# Patient Record
Sex: FEMALE | Race: BLACK OR AFRICAN AMERICAN | Employment: PART TIME | URBAN - METROPOLITAN AREA
[De-identification: names, ages, dates, MRNs, and addresses within clinical notes are randomized per-mention and may not be internally consistent; named-entity substitution may affect disease eponyms.]

---

## 2019-04-03 ENCOUNTER — APPOINTMENT (OUTPATIENT)
Dept: GENERAL RADIOLOGY | Age: 49
End: 2019-04-03
Attending: EMERGENCY MEDICINE
Payer: COMMERCIAL

## 2019-04-03 ENCOUNTER — HOSPITAL ENCOUNTER (EMERGENCY)
Age: 49
Discharge: HOME | End: 2019-04-03
Attending: EMERGENCY MEDICINE
Payer: COMMERCIAL

## 2019-04-03 VITALS
TEMPERATURE: 98 F | RESPIRATION RATE: 16 BRPM | DIASTOLIC BLOOD PRESSURE: 85 MMHG | HEART RATE: 87 BPM | OXYGEN SATURATION: 98 % | SYSTOLIC BLOOD PRESSURE: 133 MMHG

## 2019-04-03 DIAGNOSIS — S80.00XA CONTUSION OF KNEE, UNSPECIFIED LATERALITY, INITIAL ENCOUNTER: ICD-10-CM

## 2019-04-03 DIAGNOSIS — S16.1XXA STRAIN OF NECK MUSCLE, INITIAL ENCOUNTER: ICD-10-CM
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
## 2019-04-03 DIAGNOSIS — S20.219A CONTUSION OF CHEST WALL, UNSPECIFIED LATERALITY, INITIAL ENCOUNTER: ICD-10-CM

## 2019-04-03 PROCEDURE — 93005 ELECTROCARDIOGRAM TRACING: CPT

## 2019-04-03 PROCEDURE — 71045 X-RAY EXAM CHEST 1 VIEW: CPT

## 2019-04-03 PROCEDURE — 99285 EMERGENCY DEPT VISIT HI MDM: CPT

## 2019-04-03 PROCEDURE — 74011250637 HC RX REV CODE- 250/637: Performed by: EMERGENCY MEDICINE

## 2019-04-03 RX ORDER — IBUPROFEN 600 MG/1
600 TABLET ORAL
Qty: 20 TAB | Refills: 0 | Status: SHIPPED | OUTPATIENT
Start: 2019-04-03

## 2019-04-03 RX ORDER — DIAZEPAM 5 MG/1
5 TABLET ORAL
Qty: 6 TAB | Refills: 0 | Status: SHIPPED | OUTPATIENT
Start: 2019-04-03

## 2019-04-03 RX ORDER — ONDANSETRON 4 MG/1
4 TABLET, ORALLY DISINTEGRATING ORAL
Status: COMPLETED | OUTPATIENT
Start: 2019-04-03 | End: 2019-04-03

## 2019-04-03 RX ORDER — DIAZEPAM 5 MG/1
5 TABLET ORAL
Status: COMPLETED | OUTPATIENT
Start: 2019-04-03 | End: 2019-04-03

## 2019-04-03 RX ORDER — ACETAMINOPHEN 500 MG
1000 TABLET ORAL
Status: COMPLETED | OUTPATIENT
Start: 2019-04-03 | End: 2019-04-03

## 2019-04-03 RX ORDER — ACETAMINOPHEN 500 MG
1000 TABLET ORAL
Qty: 40 TAB | Refills: 0 | Status: SHIPPED | OUTPATIENT
Start: 2019-04-03

## 2019-04-03 RX ORDER — ONDANSETRON 4 MG/1
4 TABLET, ORALLY DISINTEGRATING ORAL
Qty: 8 TAB | Refills: 0 | Status: SHIPPED | OUTPATIENT
Start: 2019-04-03

## 2019-04-03 RX ORDER — IBUPROFEN 600 MG/1
600 TABLET ORAL
Status: COMPLETED | OUTPATIENT
Start: 2019-04-03 | End: 2019-04-03

## 2019-04-03 RX ADMIN — IBUPROFEN 600 MG: 600 TABLET, FILM COATED ORAL at 20:19

## 2019-04-03 RX ADMIN — ONDANSETRON 4 MG: 4 TABLET, ORALLY DISINTEGRATING ORAL at 20:19

## 2019-04-03 RX ADMIN — DIAZEPAM 5 MG: 5 TABLET ORAL at 20:19

## 2019-04-03 RX ADMIN — ACETAMINOPHEN 1000 MG: 500 TABLET, FILM COATED ORAL at 20:19

## 2019-04-03 NOTE — LETTER
NOTIFICATION RETURN TO WORK / SCHOOL 
 
4/3/2019 8:34 PM 
 
Ms. Mackenzie Funez [facility] [phone] To Whom It May Concern: 
 
Mackenzie Funez is currently under the care of [facility] EMERGENCY DEPT. She will return to work/school on: 4/5/19 If there are questions or concerns please have the patient contact our office. Sincerely, Johnnie Osullivan MD
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
## 2019-04-04 NOTE — DISCHARGE INSTRUCTIONS
Patient Education        Neck Strain: Care Instructions  Your Care Instructions    You have strained the muscles and ligaments in your neck. A sudden, awkward movement can strain the neck. This often occurs with falls or car accidents or during certain sports. Everyday activities like working on a computer or sleeping can also cause neck strain if they force you to hold your neck in an awkward position for a long time. It is common for neck pain to get worse for a day or two after an injury, but it should start to feel better after that. You may have more pain and stiffness for several days before it gets better. This is expected. It may take a few weeks or longer for it to heal completely. Good home treatment can help you get better faster and avoid future neck problems. Follow-up care is a key part of your treatment and safety. Be sure to make and go to all appointments, and call your doctor if you are having problems. It's also a good idea to know your test results and keep a list of the medicines you take. How can you care for yourself at home? · If you were given a neck brace (cervical collar) to limit neck motion, wear it as instructed for as many days as your doctor tells you to. Do not wear it longer than you were told to. Wearing a brace for too long can make neck stiffness worse and weaken the neck muscles. · You can try using heat or ice to see if it helps. ? Try using a heating pad on a low or medium setting for 15 to 20 minutes every 2 to 3 hours. Try a warm shower in place of one session with the heating pad. You can also buy single-use heat wraps that last up to 8 hours. ? You can also try an ice pack for 10 to 15 minutes every 2 to 3 hours. · Take pain medicines exactly as directed. ? If the doctor gave you a prescription medicine for pain, take it as prescribed. ? If you are not taking a prescription pain medicine, ask your doctor if you can take an over-the-counter medicine.   · Gently rub the area to relieve pain and help with blood flow. Do not massage the area if it hurts to do so. · Do not do anything that makes the pain worse. Take it easy for a couple of days. You can do your usual activities if they do not hurt your neck or put it at risk for more stress or injury. · Try sleeping on a special neck pillow. Place it under your neck, not under your head. Placing a tightly rolled-up towel under your neck while you sleep will also work. If you use a neck pillow or rolled towel, do not use your regular pillow at the same time. · To prevent future neck pain, do exercises to stretch and strengthen your neck and back. Learn how to use good posture, safe lifting techniques, and proper body mechanics. When should you call for help? Call 911 anytime you think you may need emergency care. For example, call if:    · You are unable to move an arm or a leg at all.   [facility] your doctor now or seek immediate medical care if:    · You have new or worse symptoms in your arms, legs, chest, belly, or buttocks. Symptoms may include:  ? Numbness or tingling. ? Weakness. ? Pain.     · You lose bladder or bowel control.    Watch closely for changes in your health, and be sure to contact your doctor if:    · You are not getting better as expected. Where can you learn more? Go to http://edward-kimberly.info/. Enter M253 in the search box to learn more about \"Neck Strain: Care Instructions. \"  Current as of: September 20, 2018  Content Version: 11.9  © 9123-2353 Healthwise, Incorporated. Care instructions adapted under license by [employer] (which disclaims liability or warranty for this information). If you have questions about a medical condition or this instruction, always ask your healthcare professional. [place] [address] any warranty or liability for your use of this information.          Patient Education     Contusion: Care Instructions  Your Care Instructions  Contusion is the medical term for a bruise. It is the result of a direct blow or an impact, such as a fall. Contusions are common sports injuries. Most people think of a bruise as a black-and-blue spot. This happens when small blood vessels get torn and leak blood under the skin. But bones, muscles, and organs can also get bruised. This may damage deep tissues but not cause a bruise you can see. The doctor will do a physical exam to find the location of your contusion. You may also have tests to make sure you do not have a more serious injury, such as a broken bone or nerve damage. These may include X-rays or other imaging tests like a CT scan or MRI. Deep-tissue contusions may cause pain and swelling. But if there is no serious damage, they will often get better in a few weeks with home treatment. The doctor has checked you carefully, but problems can develop later. If you notice any problems or new symptoms, get medical treatment right away. Follow-up care is a key part of your treatment and safety. Be sure to make and go to all appointments, and call your doctor if you are having problems. It's also a good idea to know your test results and keep a list of the medicines you take. How can you care for yourself at home? · Put ice or a cold pack on the sore area for 10 to 20 minutes at a time to stop swelling. Put a thin cloth between the ice pack and your skin. · Be safe with medicines. Read and follow all instructions on the label. ¨ If the doctor gave you a prescription medicine for pain, take it as prescribed. ¨ If you are not taking a prescription pain medicine, ask your doctor if you can take an over-the-counter medicine. · If you can, prop up the sore area on pillows as much as possible for the next few days. Try to keep the sore area above the level of your heart. When should you call for help? Call your doctor now or seek immediate medical care if:  · Your pain gets worse.   · You have new or worse swelling. · You have tingling, weakness, or numbness in the area near the contusion. · The area near the contusion is cold or pale. Watch closely for changes in your health, and be sure to contact your doctor if:  · You do not get better as expected. Where can you learn more? Go to Dormify.be  Enter [id number] in the search box to learn more about \"Contusion: Care Instructions. \"   © 2601-2005 Healthwise, Incorporated. Care instructions adapted under license by [facility] (which disclaims liability or warranty for this information). This care instruction is for use with your licensed healthcare professional. If you have questions about a medical condition or this instruction, always ask your healthcare professional. [address] any warranty or liability for your use of this information. Content Version: 10.4.689516; Current as of: May 22, 2015           Patient Education        Chest Contusion: Care Instructions  Your Care Instructions  A chest contusion, or bruise, is caused by a fall or direct blow to the chest. Car crashes, falls, getting punched, and injury from bicycle handlebars are common causes of chest contusions. A very forceful blow to the chest can injure the heart or blood vessels in the chest, the lungs, the airway, the liver, or the spleen. Pain may be caused by an injury to muscles, cartilage, or ribs. Deep breathing, coughing, or sneezing can increase your pain. Lying on the injured area also can cause pain. Follow-up care is a key part of your treatment and safety. Be sure to make and go to all appointments, and call your doctor if you are having problems. It's also a good idea to know your test results and keep a list of the medicines you take. How can you care for yourself at home? · Rest and protect the injured or sore area. Stop, change, or take a break from any activity that may be causing your pain.   · Put ice or a cold pack on the area for 10 to 20 minutes at a time. Put a thin cloth between the ice and your skin. · After 2 or 3 days, if your swelling is gone, apply a heating pad set on low or a warm cloth to your chest. Some doctors suggest that you go back and forth between hot and cold. Put a thin cloth between the heating pad and your skin. · Do not wrap or tape your ribs for support. This may cause you to take smaller breaths, which could increase your risk of pneumonia and lung collapse. · Ask your doctor if you can take an over-the-counter pain medicine, such as acetaminophen (Tylenol), ibuprofen (Advil, Motrin), or naproxen (Aleve). Be safe with medicines. Read and follow all instructions on the label. · Take your medicines exactly as prescribed. Call your doctor if you think you are having a problem with your medicine. · Gentle stretching and massage may help you feel better after a few days of rest. Stretch slowly to the point just before discomfort begins, then hold the stretch for at least 15 to 30 seconds. Do this 3 or 4 times per day. · As your pain gets better, slowly return to your normal activities. Be patient, because chest bruises can take weeks or months to heal. Any increased pain may be a sign that you need to rest a while longer. When should you call for help? Call 911 anytime you think you may need emergency care. For example, call if:    · You have severe trouble breathing.     · You cough up blood.    Call your doctor now or seek immediate medical care if:    · You have belly pain.     · You are dizzy or lightheaded, or you feel like you may faint.     · You develop new symptoms with the chest pain.     · Your chest pain gets worse.     · You have a fever.     · You have some shortness of breath.     · You have a cough that brings up mucus from the lungs.    Watch closely for changes in your health, and be sure to contact your doctor if:    · Your chest pain is not improving after 1 week.    Where can you learn more? Go to http://edward-kimberly.info/. Enter I174 in the search box to learn more about \"Chest Contusion: Care Instructions. \"  Current as of: September 23, 2018  Content Version: 11.9  © 4706-0065 [employer]. Care instructions adapted under license by [employer] (which disclaims liability or warranty for this information). If you have questions about a medical condition or this instruction, always ask your healthcare professional. [place] [address] any warranty or liability for your use of this information.

## 2019-04-05 LAB
ATRIAL RATE: 88 BPM
CALCULATED P AXIS, ECG09: 61 DEGREES
CALCULATED R AXIS, ECG10: 36 DEGREES
CALCULATED T AXIS, ECG11: 63 DEGREES
DIAGNOSIS, 93000: NORMAL
P-R INTERVAL, ECG05: 144 MS
Q-T INTERVAL, ECG07: 360 MS
QRS DURATION, ECG06: 78 MS
QTC CALCULATION (BEZET), ECG08: 435 MS
VENTRICULAR RATE, ECG03: 88 BPM

## 2019-04-19 ENCOUNTER — HOSPITAL ENCOUNTER (OUTPATIENT)
Dept: PHYSICAL THERAPY | Age: 49
Discharge: HOME | End: 2019-04-19
Payer: COMMERCIAL

## 2019-04-19 PROCEDURE — 97110 THERAPEUTIC EXERCISES: CPT | Performed by: PHYSICAL THERAPIST

## 2019-04-19 PROCEDURE — 97162 PT EVAL MOD COMPLEX 30 MIN: CPT | Performed by: PHYSICAL THERAPIST
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
## 2019-04-30 ENCOUNTER — HOSPITAL ENCOUNTER (OUTPATIENT)
Dept: PHYSICAL THERAPY | Age: 49
Discharge: HOME | End: 2019-04-30
Payer: COMMERCIAL

## 2019-04-30 PROCEDURE — 97110 THERAPEUTIC EXERCISES: CPT

## 2019-04-30 PROCEDURE — 97140 MANUAL THERAPY 1/> REGIONS: CPT

## 2019-04-30 NOTE — PROGRESS NOTES
PT DAILY TREATMENT NOTE 8    Patient Name: Constanza Vieyra  Date:2019  : 1970  [x]  Patient  Verified  Payor: [address] [place] [id number] Road / Plan: [address] / Product Type: Managed Care Medicaid /    In time: 2:25 pm          Out time: 3:24 pm     Total Treatment Time (min): 59  Visit #: 2 of     Treatment Area: Neck pain [M54.2]  Back pain [M54.9]    SUBJECTIVE  Pain Level (0-10 scale): 7  Any medication changes, allergies to medications, adverse drug reactions, diagnosis change, or new procedure performed?: [x] No    [] Yes (see summary sheet for update)  Subjective functional status/changes:   [] No changes reported  \"I am so stiff. \"    OBJECTIVE  Modality rationale: decrease pain and increase tissue extensibility to improve the patients ability to perform ADLs     Min Type Additional Details    [] Estim: []Att   []Unatt                  []IFC  []Premod                                            []NMES    []Other:  []w/ice   []w/heat  Position:  Location:    []  Traction: [] Cervical       [] Lumbar                       [] Supine        [] Prone                       []Intermittent   []Continuous Lbs:  [] before manual  [] after manual    []  Ultrasound: []Continuous   [] Pulsed                           []1MHz   []3MHz Location:  W/cm2:    []  Iontophoresis with dexamethasone         Location: [] Take home patch   [] In clinic   10 []  Ice     [x]  heat  []  Ice massage Position: prone  Location: C/S and T/S    []  Vasopneumatic Device Pressure: [] lo [] med [] hi   Temp: [] lo [] med [] hi   [x] Skin assessment post-treatment:  [x]intact    []redness- no adverse reaction                                                                                 []redness - adverse reaction:     39 min Therapeutic Exercise:  [x] See flow sheet: initiated therex per IE   Rationale: increase ROM, increase strength and improve coordination to improve the patients ability to perform ADLs, drive, rotate for conversation     10 min Manual Therapy: grade III PA mobs to T/S; prone DTM/TPR to (B) UT and rhomboids   Rationale: decrease pain, increase ROM, increase tissue extensibility and decrease trigger points to improve the patients ability to perform ADLs          with TE min Patient Education: [x] Review HEP from IE; perform HEP in pain-free, but end-range ROM     Other Objective/Functional Measures:   (B) UT tightness    Pain Level (0-10 scale) post treatment: 5    ASSESSMENT/Changes in Function:   Good tolerance to treatment today with patient req 100% verbal/tactile cueing and demo for proper form/technique with all newly introduced therex. Patient will continue to benefit from skilled PT services to modify and progress therapeutic interventions, address functional mobility deficits, address ROM deficits, address strength deficits, analyze and address soft tissue restrictions, analyze and cue movement patterns, analyze and modify body mechanics/ergonomics and assess and modify postural abnormalities to attain remaining goals. [x]  See Plan of Care  []  See progress note/recertification  []  See Discharge Summary         Progress towards goals / Updated goals: · Short Term Goals: To be accomplished in  4  treatments:  1. Patient will be (I) with HEP to maintain gains from therapy. · Long Term Goals: To be accomplished in  . 8-10  treatments:  1. Increase FOTO to 63 points for improved QOL. 2. Demonstrate CROM rotation to at least 65 deg bilaterally for improved ability to turn head safely for driving. 3. 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 4. Patient will report no reproduction of neck pain with shoulder elevation to enable her to wash and comb her hair. 5. Patient will report a 75% improvement in neck symptoms from start of care for improved activity tolerance.      PLAN  [x]  Upgrade activities as tolerated     [x]  Continue plan of care  []  Update interventions per flow sheet       []  Discharge due to:_  [x]  Other: assess response to first f/u treatment     Fletcher Elizalde, PTA 4/30/2019

## 2019-05-01 ENCOUNTER — HOSPITAL ENCOUNTER (OUTPATIENT)
Dept: PHYSICAL THERAPY | Age: 49
Discharge: HOME | End: 2019-05-01
Payer: COMMERCIAL

## 2019-05-01 PROCEDURE — 97110 THERAPEUTIC EXERCISES: CPT

## 2019-05-01 PROCEDURE — 97140 MANUAL THERAPY 1/> REGIONS: CPT

## 2019-05-01 NOTE — PROGRESS NOTES
PT DAILY TREATMENT NOTE     Patient Name: Gali Wilkes  Date:2019  : 1970  [x]  Patient  Verified  Payor: [address] [place] [address] Road / Plan: [address] / Product Type: Managed Care Medicaid /    In time: 5:30 pm       Out time: 6:21 pm     Total Treatment Time (min): 51  Visit #: 3 of     Treatment Area: Neck pain [M54.2]  Back pain [M54.9]    SUBJECTIVE  Pain Level (0-10 scale): 4  Any medication changes, allergies to medications, adverse drug reactions, diagnosis change, or new procedure performed?: [x] No    [] Yes (see summary sheet for update)  Subjective functional status/changes:   [] No changes reported  \"I am feeling better. \"    OBJECTIVE  Modality rationale: decrease pain and increase tissue extensibility to improve the patients ability to perform ADLs     Min Type Additional Details    [] Estim: []Att   []Unatt                  []IFC  []Premod                                            []NMES    []Other:  []w/ice   []w/heat  Position:  Location:    []  Traction: [] Cervical       [] Lumbar                       [] Supine        [] Prone                       []Intermittent   []Continuous Lbs:  [] before manual  [] after manual    []  Ultrasound: []Continuous   [] Pulsed                           []1MHz   []3MHz Location:  W/cm2:    []  Iontophoresis with dexamethasone         Location: [] Take home patch   [] In clinic   10 []  Ice     [x]  heat  []  Ice massage Position: prone  Location: C/S and T/S    []  Vasopneumatic Device Pressure: [] lo [] med [] hi   Temp: [] lo [] med [] hi   [x] Skin assessment post-treatment:  [x]intact    []redness- no adverse reaction                                                                                 []redness - adverse reaction:     31 min Therapeutic Exercise:  [x] See flow sheet: added thoracic rotation   Rationale: increase ROM, increase strength and improve coordination to improve the patients ability to perform ADLs, drive, rotate for conversation     10 min Manual Therapy: prone DTM/TPR to (B) UT and rhomboids, CPS   Rationale: decrease pain, increase ROM, increase tissue extensibility and decrease trigger points to improve the patients ability to perform ADLs          with TE min Patient Education: [x] Review HEP; added thoracic matrix rotations     Other Objective/Functional Measures:   T/S rotation AROM: 75%    Pain Level (0-10 scale) post treatment: 4    ASSESSMENT/Changes in Function:   Patient notes first f/u treatment did not cause any adverse reaction. Improved tolerance to therex today. Patient will continue to benefit from skilled PT services to modify and progress therapeutic interventions, address functional mobility deficits, address ROM deficits, address strength deficits, analyze and address soft tissue restrictions, analyze and cue movement patterns, analyze and modify body mechanics/ergonomics and assess and modify postural abnormalities to attain remaining goals. [x]  See Plan of Care  []  See progress note/recertification  []  See Discharge Summary         Progress towards goals / Updated goals: · Short Term Goals: To be accomplished in  4  treatments:  1. Patient will be (I) with HEP to maintain gains from therapy. · Long Term Goals: To be accomplished in  . 8-10  treatments:  1. Increase FOTO to 63 points for improved QOL. 2. Demonstrate CROM rotation to at least 65 deg bilaterally for improved ability to turn head safely for driving. 3. 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 4. Patient will report no reproduction of neck pain with shoulder elevation to enable her to wash and comb her hair. 5. Patient will report a 75% improvement in neck symptoms from start of care for improved activity tolerance.      PLAN  [x]  Upgrade activities as tolerated     [x]  Continue plan of care  []  Update interventions per flow sheet       []  Discharge due to:_  []  Other:_    Nguyen Pickens, URIAH 5/1/2019

## 2019-05-06 ENCOUNTER — HOSPITAL ENCOUNTER (OUTPATIENT)
Dept: PHYSICAL THERAPY | Age: 49
Discharge: HOME | End: 2019-05-06
Payer: COMMERCIAL

## 2019-05-06 PROCEDURE — 97140 MANUAL THERAPY 1/> REGIONS: CPT

## 2019-05-06 PROCEDURE — 97110 THERAPEUTIC EXERCISES: CPT

## 2019-05-06 NOTE — PROGRESS NOTES
PT DAILY TREATMENT NOTE     Patient Name: Talat Mitchell  Date:2019  : 1970  [x]  Patient  Verified  [facility] [address] / Plan: [address] / Product Type: Managed Care Medicaid /    In time:10:38  Out time:11:42  Total Treatment Time (min): 64  Total Timed Codes (min): 54  1:1 Treatment Time (min): 54   Visit #: 4 of     Treatment Area: Neck pain [M54.2]  Back pain [M54.9]    SUBJECTIVE  Pain Level (0-10 scale): 3  Any medication changes, allergies to medications, adverse drug reactions, diagnosis change, or new procedure performed?: [x] No    [] Yes (see summary sheet for update)  Subjective functional status/changes:   [] No changes reported  Pt was 8 min late for session.      Pt notes lower back tightness and stiffness      OBJECTIVE  Modality rationale: decrease pain and increase tissue extensibility to improve the patients ability to improve ADLs and work tolerance    Min Type Additional Details   10 []  Ice     [x]  heat  []  Ice massage Position: prone  Location: C/S and T/S    []  Vasopneumatic Device Pressure:       [] lo [] med [] hi   Temperature: [] lo [] med [] hi   [x] Skin assessment post-treatment:  [x]intact []redness- no adverse reaction       []redness - adverse reaction:       39 (billed 2) min Therapeutic Exercise:  [x] See flow sheet : initiate prone letters   Rationale: increase ROM, increase strength and improve coordination to improve the patients ability to ambulate, sit, perform ADLs    15 min Manual Therapy:  DTM to lumbar paraspinals in prone; sacral float; PA glides to lower lumbar Grade II to III; prone DTM/TPR to (R) > (L) UT and rhomboids, CPS   Rationale: decrease pain, increase ROM, increase tissue extensibility and decrease trigger points to improve           x min Patient Education: [x] Review HEP    [] Progressed/Changed HEP based on:   [] positioning   [] body mechanics   [] transfers   [] heat/ice application        Other Objective/Functional Measures: TrP with LTR's elicited in the R UT with DTM   Cramping in the lumbar and T/S with seated rotations     Pain Level (0-10 scale) post treatment: 4    ASSESSMENT/Changes in Function:  Pt with tightness in the lumber spine. No obvious response to PA glides today In the lumbar. Decreased LTR's in the R UT with manual therapy. Pt performed well with all new and current TE. Patient will continue to benefit from skilled PT services to modify and progress therapeutic interventions, address functional mobility deficits, address ROM deficits, address strength deficits, analyze and address soft tissue restrictions, analyze and cue movement patterns, analyze and modify body mechanics/ergonomics, assess and modify postural abnormalities and instruct in home and community integration to attain remaining goals. []  See Plan of Care  []  See progress note/recertification  []  See Discharge Summary         Progress towards goals / Updated goals: · Short Term Goals: To be accomplished in  4  treatments:  1. Patient will be (I) with HEP to maintain gains from therapy. Pt notes HEP compliance (5//19)  · Long Term Goals: To be accomplished in  . 8-10  treatments:  1. Increase FOTO to 63 points for improved QOL. 2. Demonstrate CROM rotation to at least 65 deg bilaterally for improved ability to turn head safely for driving. 3. 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 4. Patient will report no reproduction of neck pain with shoulder elevation to enable her to wash and comb her hair. Good response to prone letters to simulate GHJ TE (5//19)  5.  Patient will report a 75% improvement in neck symptoms from start of care for improved activity tolerance.     PLAN  [x]  Upgrade activities as tolerated     []  Continue plan of care  []  Update interventions per flow sheet       []  Discharge due to:_  [x]  Other:_ Assess C/S SHERIE Ivy, PT 5/6/2019  8:09 AM

## 2019-05-08 ENCOUNTER — HOSPITAL ENCOUNTER (OUTPATIENT)
Dept: PHYSICAL THERAPY | Age: 49
Discharge: HOME | End: 2019-05-08
Payer: COMMERCIAL

## 2019-05-08 PROCEDURE — 97110 THERAPEUTIC EXERCISES: CPT

## 2019-05-08 PROCEDURE — 97140 MANUAL THERAPY 1/> REGIONS: CPT

## 2019-05-08 NOTE — PROGRESS NOTES
PT DAILY TREATMENT NOTE     Patient Name: Gilberto Chandler  Date:2019  : 1970  [x]  Patient  Verified  Payor: [address] / Plan: [address] / Product Type: Managed Care Medicaid /    In time: 2:00 pm         Out time: 3:12 pm  Total Treatment Time (min): 72  Visit #: 5 of     Treatment Area: Neck pain [M54.2]  Back pain [M54.9]    SUBJECTIVE  Pain Level (0-10 scale): 0  Any medication changes, allergies to medications, adverse drug reactions, diagnosis change, or new procedure performed?: [x] No    [] Yes (see summary sheet for update)  Subjective functional status/changes:   [] No changes reported  \"Just tight today. \"    OBJECTIVE  Modality rationale: decrease pain and increase tissue extensibility to improve the patients ability to improve ADLs and work tolerance    Min Type Additional Details   10 []  Ice     [x]  heat  []  Ice massage Position: prone  Location: C/S and T/S    []  Vasopneumatic Device Pressure:       [] lo [] med [] hi   Temperature: [] lo [] med [] hi   [x] Skin assessment post-treatment:  [x]intact []redness- no adverse reaction       []redness - adverse reaction:     48 min Therapeutic Exercise:  [x] See flow sheet   Rationale: increase ROM, increase strength and improve coordination to improve the patients ability to ambulate, sit, perform ADLs    14 min Manual Therapy:  DTM to lumbar paraspinals and upper trapezius in prone   Rationale: decrease pain, increase ROM, increase tissue extensibility and decrease trigger points to improve           X min Patient Education: [x] Review HEP       Other Objective/Functional Measures:   C/S AROM: flex to chest, ext 40 deg, rot (R) 66 deg (L) 60 deg, SB (R) 35 deg (L) 40 deg    Pain Level (0-10 scale) post treatment: 0    ASSESSMENT/Changes in Function:    Improved C/S mobility. Cont's with posterior chain tightness addressed and responding well with manual interventions.     Patient will continue to benefit from skilled PT services to modify and progress therapeutic interventions, address functional mobility deficits, address ROM deficits, address strength deficits, analyze and address soft tissue restrictions, analyze and cue movement patterns, analyze and modify body mechanics/ergonomics, assess and modify postural abnormalities and instruct in home and community integration to attain remaining goals. []  See Plan of Care  []  See progress note/recertification  []  See Discharge Summary         Progress towards goals / Updated goals: · Short Term Goals: To be accomplished in  4  treatments:  1. Patient will be (I) with HEP to maintain gains from therapy. Pt notes HEP compliance (5//19)  · Long Term Goals: To be accomplished in  . 8-10  treatments:  1. Increase FOTO to 63 points for improved QOL. 2. Demonstrate CROM rotation to at least 65 deg bilaterally for improved ability to turn head safely for driving. -Goal progressing; rot (R) 66 deg (L) 60 deg CS AROM (5/8/19)  3. 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 -Goal met; pt demos full AROM flexion without reproduction of HA (5/8/19)   4. Patient will report no reproduction of neck pain with shoulder elevation to enable her to wash and comb her hair.  -Good response to prone letters to simulate GHJ TE (5//19)  5.  Patient will report a 75% improvement in neck symptoms from start of care for improved activity tolerance.     PLAN  [x]  Upgrade activities as tolerated     [x]  Continue plan of care  []  Update interventions per flow sheet       []  Discharge due to:_  []  Other:_    Indra Jacques, PTA 5/8/2019

## 2019-05-13 ENCOUNTER — HOSPITAL ENCOUNTER (OUTPATIENT)
Dept: PHYSICAL THERAPY | Age: 49
Discharge: HOME | End: 2019-05-13
Payer: COMMERCIAL

## 2019-05-13 PROCEDURE — 97140 MANUAL THERAPY 1/> REGIONS: CPT

## 2019-05-13 PROCEDURE — 97110 THERAPEUTIC EXERCISES: CPT

## 2019-05-13 NOTE — PROGRESS NOTES
PT DAILY TREATMENT NOTE     Patient Name: Juan M Ortiz  Date:2019  : 1970  [x]  Patient  Verified  Payor: [address] [place] [address] Road / Plan: [address] / Product Type: Managed Care Medicaid /    In time: 5:30 pm          Out time: 6:28 pm  Total Treatment Time (min): 58  Visit #: 6 of     Treatment Area: Neck pain [M54.2]  Back pain [M54.9]    SUBJECTIVE  Pain Level (0-10 scale): 3 in neck, 5 in back  Any medication changes, allergies to medications, adverse drug reactions, diagnosis change, or new procedure performed?: [x] No    [] Yes (see summary sheet for update)  Subjective functional status/changes:   [] No changes reported  Did a lot of lifting and setting up over the weekend. Too much sitting for the graduation. OBJECTIVE  Modality rationale: decrease pain and increase tissue extensibility to improve the patients ability to improve ADLs and work tolerance    Min Type Additional Details   10 []  Ice     [x]  heat  []  Ice massage Position: prone  Location: C/S and T/S    []  Vasopneumatic Device Pressure:       [] lo [] med [] hi   Temperature: [] lo [] med [] hi   [x] Skin assessment post-treatment:  [x]intact []redness- no adverse reaction       []redness - adverse reaction:     36 min Therapeutic Exercise:  [x] See flow sheet   Rationale: increase ROM, increase strength and improve coordination to improve the patients ability to ambulate, sit, perform ADLs    12 min Manual Therapy:  DTM to lumbar paraspinals and upper trapezius in prone   Rationale: decrease pain, increase ROM, increase tissue extensibility and decrease trigger points to improve           X min Patient Education: [x] Review HEP       Other Objective/Functional Measures:     Pain Level (0-10 scale) post treatment: 1    ASSESSMENT/Changes in Function:    Patient demos improved, normalized gait.     Patient will continue to benefit from skilled PT services to modify and progress therapeutic interventions, address functional mobility deficits, address ROM deficits, address strength deficits, analyze and address soft tissue restrictions, analyze and cue movement patterns, analyze and modify body mechanics/ergonomics, assess and modify postural abnormalities and instruct in home and community integration to attain remaining goals. []  See Plan of Care  []  See progress note/recertification  []  See Discharge Summary         Progress towards goals / Updated goals: · Short Term Goals: To be accomplished in  4  treatments:  1. Patient will be (I) with HEP to maintain gains from therapy. Pt notes HEP compliance (5//19)  · Long Term Goals: To be accomplished in  . 8-10  treatments:  1. Increase FOTO to 63 points for improved QOL. 2. Demonstrate CROM rotation to at least 65 deg bilaterally for improved ability to turn head safely for driving. -Goal progressing; rot (R) 66 deg (L) 60 deg CS AROM (5/8/19)  3. 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 -Goal met; pt demos full AROM flexion without reproduction of HA (5/8/19)   4. Patient will report no reproduction of neck pain with shoulder elevation to enable her to wash and comb her hair.  -Good response to prone letters to simulate GHJ TE (5//19)  5.  Patient will report a 75% improvement in neck symptoms from start of care for improved activity tolerance.     PLAN  [x]  Upgrade activities as tolerated     [x]  Continue plan of care  []  Update interventions per flow sheet       []  Discharge due to:_  []  Other:_    Ashley Delaney, URIAH 5/13/2019

## 2019-05-15 ENCOUNTER — HOSPITAL ENCOUNTER (OUTPATIENT)
Dept: PHYSICAL THERAPY | Age: 49
Discharge: HOME | End: 2019-05-15
Payer: COMMERCIAL

## 2019-05-15 PROCEDURE — 97140 MANUAL THERAPY 1/> REGIONS: CPT

## 2019-05-15 PROCEDURE — 97110 THERAPEUTIC EXERCISES: CPT

## 2019-05-15 NOTE — PROGRESS NOTES
PT DAILY TREATMENT NOTE     Patient Name: Debbie Garcia  Date:5/15/2019  : 1970  [x]  Patient  Verified  Payor: [address] [place] [address] Road / Plan: [address] / Product Type: Managed Care Medicaid /    In time: 11:28 am           Out time: 12:36 pm  Total Treatment Time (min): 68  Visit #: 7 of     Treatment Area: Neck pain [M54.2]  Back pain [M54.9]    SUBJECTIVE  Pain Level (0-10 scale): 7 in low back, 0-1 in neck  Any medication changes, allergies to medications, adverse drug reactions, diagnosis change, or new procedure performed?: [x] No    [] Yes (see summary sheet for update)  Subjective functional status/changes:   [] No changes reported  \"Stiff today. \"    OBJECTIVE  Modality rationale: decrease pain and increase tissue extensibility to improve the patients ability to improve ADLs and work tolerance    Min Type Additional Details   10 []  Ice     [x]  heat  []  Ice massage Position: prone  Location: C/S and T/S    []  Vasopneumatic Device Pressure:       [] lo [] med [] hi   Temperature: [] lo [] med [] hi   [x] Skin assessment post-treatment:  [x]intact []redness- no adverse reaction       []redness - adverse reaction:     45 min Therapeutic Exercise:  [x] See flow sheet: added LTR and open books, SB TA isometrics   Rationale: increase ROM, increase strength and improve coordination to improve the patients ability to ambulate, sit, perform ADLs    13 min Manual Therapy:  DTM to lumbar paraspinals, sacral float, grade III PA mobs to lower L/S   Rationale: decrease pain, increase ROM, increase tissue extensibility and decrease trigger points to improve           X min Patient Education: [x] Review HEP       Other Objective/Functional Measures:       Pain Level (0-10 scale) post treatment: 5 in L/S; 0 in C/S    ASSESSMENT/Changes in Function:    Patient with inc tissue tension in the LPS. No obvious change in symptoms with PA mobs or sacral float.  Discussed proper sitting ergonomics for work duties. Patient will continue to benefit from skilled PT services to modify and progress therapeutic interventions, address functional mobility deficits, address ROM deficits, address strength deficits, analyze and address soft tissue restrictions, analyze and cue movement patterns, analyze and modify body mechanics/ergonomics, assess and modify postural abnormalities and instruct in home and community integration to attain remaining goals. []  See Plan of Care  []  See progress note/recertification  []  See Discharge Summary         Progress towards goals / Updated goals: · Short Term Goals: To be accomplished in  4  treatments:  1. Patient will be (I) with HEP to maintain gains from therapy. Pt notes HEP compliance (5//19)  · Long Term Goals: To be accomplished in  . 8-10  treatments:  1. Increase FOTO to 63 points for improved QOL. 2. Demonstrate CROM rotation to at least 65 deg bilaterally for improved ability to turn head safely for driving. -Goal progressing; rot (R) 66 deg (L) 60 deg CS AROM (5/8/19)  3. 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 -Goal met; pt demos full AROM flexion without reproduction of HA (5/8/19)   4. Patient will report no reproduction of neck pain with shoulder elevation to enable her to wash and comb her hair.  -Good response to prone letters to simulate GHJ TE (5//19)  5.  Patient will report a 75% improvement in neck symptoms from start of care for improved activity tolerance. -Goal progressing; pt notes minimal C/S pain today (5/15/19)    PLAN  [x]  Upgrade activities as tolerated     [x]  Continue plan of care  []  Update interventions per flow sheet       []  Discharge due to:_  []  Other:_    Fletcher Elizalde, PTA 5/15/2019

## 2019-05-20 ENCOUNTER — APPOINTMENT (OUTPATIENT)
Dept: PHYSICAL THERAPY | Age: 49
End: 2019-05-20
Payer: COMMERCIAL

## 2019-05-22 ENCOUNTER — HOSPITAL ENCOUNTER (OUTPATIENT)
Dept: PHYSICAL THERAPY | Age: 49
Discharge: HOME | End: 2019-05-22
Payer: COMMERCIAL

## 2019-05-22 PROCEDURE — 97140 MANUAL THERAPY 1/> REGIONS: CPT

## 2019-05-22 PROCEDURE — 97110 THERAPEUTIC EXERCISES: CPT
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
## 2019-05-22 NOTE — PROGRESS NOTES
PT DAILY TREATMENT NOTE     Patient Name: Gali Wilkes  Date:2019  : 1970  [x]  Patient  Verified  Payor: [address] / Plan: [address] / Product Type: Managed Care Medicaid /    In time: 10:30 am           Out time: 11:31 am  Total Treatment Time (min): 61  Visit #: 8 of     Treatment Area: Neck pain [M54.2]  Back pain [M54.9]    SUBJECTIVE  Pain Level (0-10 scale): 1-2 in low back, 0 in neck  Any medication changes, allergies to medications, adverse drug reactions, diagnosis change, or new procedure performed?: [x] No    [] Yes (see summary sheet for update)  Subjective functional status/changes:   [] No changes reported  \"I have been doing really good, just my back hurts when I do too much. \"    OBJECTIVE  Modality rationale: decrease pain and increase tissue extensibility to improve the patients ability to improve ADLs and work tolerance    Min Type Additional Details   10 []  Ice     [x]  heat  []  Ice massage Position: prone  Location: C/S and T/S    []  Vasopneumatic Device Pressure:       [] lo [] med [] hi   Temperature: [] lo [] med [] hi   [x] Skin assessment post-treatment:  [x]intact []redness- no adverse reaction       []redness - adverse reaction:     41 min Therapeutic Exercise:  [x] See flow sheet: assisted pt with FOTO completion   Rationale: increase ROM, increase strength and improve coordination to improve the patients ability to ambulate, sit, perform ADLs    10 min Manual Therapy:  DTM to lumbar paraspinals, sacral float, grade III PA mobs to lower L/S; reassessment   Rationale: decrease pain, increase ROM, increase tissue extensibility and decrease trigger points to improve           X min Patient Education: [x] Review HEP       Other Objective/Functional Measures:   Subjective improvement of 70% in symptoms since IE reported.   Improvements: inc neck mobility for conversation/driving, prolonged walking, stairs, inc postural awareness with sitting at work  Deficits: heavy lifting,   FOTO score: 68/100 (at last assessment, 49/100)  C/S AROM: 100% in all directions  L/S AROM: flex fingertips to ankles, 100% in all other directions  (B) shoulder AROM: WFL for elevation  Pain: (A) 0-1 in C/S    Pain Level (0-10 scale) post treatment: 0    ASSESSMENT/Changes in Function:    See PN. Patient will continue to benefit from skilled PT services to modify and progress therapeutic interventions, address functional mobility deficits, address ROM deficits, address strength deficits, analyze and address soft tissue restrictions, analyze and cue movement patterns, analyze and modify body mechanics/ergonomics, assess and modify postural abnormalities and instruct in home and community integration to attain remaining goals. []  See Plan of Care  [x]  See progress note/recertification (4/07/34)  []  See Discharge Summary         Progress towards goals / Updated goals:  1. Increase FOTO to 63 points for improved QOL. -Goal met; inc to 68/100  2. Demonstrate CROM rotation to at least 65 deg bilaterally for improved ability to turn head safely for driving. -Goal met; (B) rotation 100%   3. 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 -Goal met; pt demos full AROM flexion without reproduction of HA  4. Patient will report no reproduction of neck pain with shoulder elevation to enable her to wash and comb her hair.  -Goal met; pt demos full shoulder elevation without neck pain or headache  5.  Patient will report a 75% improvement in neck symptoms from start of care for improved activity tolerance. -Goal progressing; 70% subjective improvement    PLAN  [x]  Upgrade activities as tolerated     [x]  Continue plan of care  []  Update interventions per flow sheet       []  Discharge due to:_  [x]  Other: see PN; cont for last scheduled tx and finalize HEP    Padmini Burton PTA 5/22/2019

## 2019-05-24 ENCOUNTER — HOSPITAL ENCOUNTER (OUTPATIENT)
Dept: PHYSICAL THERAPY | Age: 49
Discharge: HOME | End: 2019-05-24
Payer: COMMERCIAL

## 2019-05-24 PROCEDURE — 97110 THERAPEUTIC EXERCISES: CPT

## 2019-05-24 PROCEDURE — 97140 MANUAL THERAPY 1/> REGIONS: CPT

## 2019-05-24 NOTE — PROGRESS NOTES
PT DAILY TREATMENT NOTE     Patient Name: Ronnie Cunha  Date:2019  : 1970  [x]  Patient  Verified  [facility] / Plan: [address] / Product Type: Managed Care Medicaid /    In time: 2:02 pm        Out time: 3:00 pm  Total Treatment Time (min): 58  Visit #: 1 of 1    Treatment Area: Neck pain [M54.2]  Back pain [M54.9]    SUBJECTIVE  Pain Level (0-10 scale): 0 in neck  Any medication changes, allergies to medications, adverse drug reactions, diagnosis change, or new procedure performed?: [x] No    [] Yes (see summary sheet for update)  Subjective functional status/changes:   [] No changes reported  \"I have been good. \"    OBJECTIVE  Modality rationale: decrease pain and increase tissue extensibility to improve the patients ability to improve ADLs and work tolerance    Min Type Additional Details   10 []  Ice     [x]  heat  []  Ice massage Position: prone  Location: C/S and T/S    []  Vasopneumatic Device Pressure:       [] lo [] med [] hi   Temperature: [] lo [] med [] hi   [x] Skin assessment post-treatment:  [x]intact []redness- no adverse reaction       []redness - adverse reaction:     38 min Therapeutic Exercise:  [x] See flow sheet: assisted pt with FOTO completion   Rationale: increase ROM, increase strength and improve coordination to improve the patients ability to ambulate, sit, perform ADLs    10 min Manual Therapy:  DTM to lumbar paraspinals   Rationale: decrease pain, increase ROM, increase tissue extensibility and decrease trigger points to improve           X min Patient Education: [x] Review HEP       Other Objective/Functional Measures:   Subjective improvement: 90%    Pain Level (0-10 scale) post treatment: 0    ASSESSMENT/Changes in Function:    Patient demos (I) and understanding of finalized HEP in preparation for D/C.     Patient will continue to benefit from skilled PT services to modify and progress therapeutic interventions, address functional mobility deficits, address ROM deficits, address strength deficits, analyze and address soft tissue restrictions, analyze and cue movement patterns, analyze and modify body mechanics/ergonomics, assess and modify postural abnormalities and instruct in home and community integration to attain remaining goals. [x]  See Discharge Summary         Progress towards goals / Updated goals:  1. Patient will be (I) with finalized HEP in preparation for D/C to self-management of symptoms. -Goal met  2.   Patient will report a 75% improvement in neck symptoms from start of care for improved activity tolerance. -Goal met; pt notes 90% improvement    PLAN    [x]  Discharge due to: patient meeting goals    Jenny Ross, URIAH 5/24/2019

## 2019-10-07 ENCOUNTER — HOSPITAL ENCOUNTER (OUTPATIENT)
Dept: MAMMOGRAPHY | Age: 49
Discharge: HOME | End: 2019-10-07
Attending: GENERAL PRACTICE
Payer: COMMERCIAL

## 2019-10-07 DIAGNOSIS — Z12.31 VISIT FOR SCREENING MAMMOGRAM: ICD-10-CM

## 2019-10-07 DIAGNOSIS — Z12.39 SCREENING FOR BREAST CANCER: ICD-10-CM

## 2019-10-07 PROCEDURE — 77063 BREAST TOMOSYNTHESIS BI: CPT

## 2020-11-16 ENCOUNTER — HOSPITAL ENCOUNTER (OUTPATIENT)
Dept: MAMMOGRAPHY | Age: 50
Discharge: HOME | End: 2020-11-16
Attending: GENERAL PRACTICE
Payer: COMMERCIAL

## 2020-11-16 DIAGNOSIS — Z12.31 VISIT FOR SCREENING MAMMOGRAM: ICD-10-CM

## 2020-11-16 PROCEDURE — 77063 BREAST TOMOSYNTHESIS BI: CPT

## 2024-04-02 RX ORDER — RAMIPRIL 2.5 MG/1
CAPSULE ORAL
Qty: 90 CAPSULE | Refills: 3 | Status: SHIPPED | OUTPATIENT
Start: 2024-04-02